# Patient Record
(demographics unavailable — no encounter records)

---

## 2024-10-18 NOTE — REVIEW OF SYSTEMS
[Cough] : cough [Sputum] : sputum [Dyspnea] : dyspnea [SOB on Exertion] : sob on exertion [Negative] : Endocrine [Hemoptysis] : no hemoptysis [Chest Tightness] : no chest tightness [Frequent URIs] : no frequent URIs [Pleuritic Pain] : no pleuritic pain [Wheezing] : no wheezing [A.M. Dry Mouth] : no a.m. dry mouth

## 2024-10-18 NOTE — REASON FOR VISIT
[Follow-Up] : a follow-up visit [Sleep Apnea] : sleep apnea [Cough] : cough [COPD] : COPD [Emphysema] : emphysema

## 2024-10-18 NOTE — PROCEDURE
[FreeTextEntry1] : CXR PA and Lateral  The costophrenic and cardiophrenic angles are sharp The patricio parenchyma shows no infiltrates, consolidations, or nodules  The Mediastinum is within normal limits No pleural effusions

## 2024-10-18 NOTE — ASSESSMENT
[FreeTextEntry1] : Severe COPD with mild exacerbation  ON Trelegy HO 40 py smoking quit 2011.  Smoked cigarettes 1 ppd for 40 years.   Small lung nodules Likely infectious / inflammatory  New 1.4 cm X 2.8 cm RUL resolved on repeat CT  Possible HERB

## 2024-10-18 NOTE — PHYSICAL EXAM
[No Acute Distress] : no acute distress [Normal Oropharynx] : normal oropharynx [Normal Appearance] : normal appearance [No Neck Mass] : no neck mass [Normal Rate/Rhythm] : normal rate/rhythm [Normal S1, S2] : normal s1, s2 [No Murmurs] : no murmurs [No Resp Distress] : no resp distress [Rhonchi] : rhonchi [No Abnormalities] : no abnormalities [Benign] : benign [Normal Gait] : normal gait [No Clubbing] : no clubbing [No Cyanosis] : no cyanosis [No Edema] : no edema [FROM] : FROM [Normal Color/ Pigmentation] : normal color/ pigmentation [No Focal Deficits] : no focal deficits [Oriented x3] : oriented x3 [Normal Affect] : normal affect [TextBox_68] : Bilateral rales, negative wheezing

## 2024-10-18 NOTE — HISTORY OF PRESENT ILLNESS
[Initial Evaluation] : an initial evaluation of [Excessive Daytime Sleepiness] : excessive daytime sleepiness [Snoring] : snoring [Unrefreshing Sleep] : unrefreshing sleep [Currently Experiencing] : The patient is currently experiencing symptoms. [None] : No associated symptoms are reported [Stable] : stable [Difficulty Breathing During Exertion] : denies dyspnea on exertion [Feelings Of Weakness On Exertion] : improved exercise intolerance [Cough] : improved coughing [Coughing Up Sputum] : denies coughing up sputum [Wheezing] : denies wheezing [Regional Soft Tissue Swelling Both Lower Extremities] : denies lower extremity edema [Wt Gain ___ Lbs] : recent [unfilled] ~Upounds weight gain [Adherent] : the patient is adherent with ~his/her~ medication regimen [Goals--Doing Well] : the patient is doing well with ~his/her~ COPD goals [PFTs] : pulmonary function tests [TextBox_4] : 61 yo F with PMHx of AFIB on Eliquis, [place] and Hyperthyroidism SP thyroidectomy, COPD/Emphysema on Trilegy presented with complaint fo worsening dyspnea on exertion, productive cough of greenish sputum for 3 weeks duration, did not tolerate course of Augmentin? prescribed by PMD. States grandchildren were sick with common cold symptoms 3 weeks prior. \par  \par  Patient is ex-smoker, quit 12 years ago, smoked 1ppd for 40 years, denies ETOH or illicit drug use.  [Fever] : no fever

## 2024-12-26 NOTE — ASSESSMENT
[FreeTextEntry1] : Severe COPD   ON Trelegy HO 40 py smoking quit 2011.  Smoked cigarettes 1 ppd for 40 years.   Small lung nodules Likely infectious / inflammatory  New 1.4 cm X 2.8 cm RUL resolved on repeat CT  Moderate HERB on HST